# Patient Record
Sex: FEMALE | Race: ASIAN | NOT HISPANIC OR LATINO | Employment: PART TIME | URBAN - METROPOLITAN AREA
[De-identification: names, ages, dates, MRNs, and addresses within clinical notes are randomized per-mention and may not be internally consistent; named-entity substitution may affect disease eponyms.]

---

## 2019-08-20 ENCOUNTER — HOSPITAL ENCOUNTER (EMERGENCY)
Facility: HOSPITAL | Age: 49
Discharge: HOME | End: 2019-08-21
Attending: EMERGENCY MEDICINE | Admitting: EMERGENCY MEDICINE
Payer: COMMERCIAL

## 2019-08-20 VITALS
HEART RATE: 61 BPM | DIASTOLIC BLOOD PRESSURE: 83 MMHG | RESPIRATION RATE: 16 BRPM | OXYGEN SATURATION: 99 % | SYSTOLIC BLOOD PRESSURE: 124 MMHG | TEMPERATURE: 97.6 F

## 2019-08-20 DIAGNOSIS — M25.522 PAIN AND SWELLING OF LEFT ELBOW: Primary | ICD-10-CM

## 2019-08-20 DIAGNOSIS — M25.422 PAIN AND SWELLING OF LEFT ELBOW: Primary | ICD-10-CM

## 2019-08-20 DIAGNOSIS — S59.902A INJURY OF LEFT ELBOW: ICD-10-CM

## 2019-08-20 PROCEDURE — 99283 EMERGENCY DEPT VISIT LOW MDM: CPT

## 2019-08-21 ENCOUNTER — APPOINTMENT (EMERGENCY)
Dept: RADIOLOGY | Facility: HOSPITAL | Age: 49
End: 2019-08-21
Payer: COMMERCIAL

## 2019-08-21 PROCEDURE — 73080 X-RAY EXAM OF ELBOW: CPT

## 2019-08-21 PROCEDURE — 99284 EMERGENCY DEPT VISIT MOD MDM: CPT | Performed by: EMERGENCY MEDICINE

## 2019-08-21 PROCEDURE — 96372 THER/PROPH/DIAG INJ SC/IM: CPT

## 2019-08-21 RX ORDER — KETOROLAC TROMETHAMINE 30 MG/ML
15 INJECTION, SOLUTION INTRAMUSCULAR; INTRAVENOUS ONCE
Status: COMPLETED | OUTPATIENT
Start: 2019-08-21 | End: 2019-08-21

## 2019-08-21 RX ADMIN — KETOROLAC TROMETHAMINE 15 MG: 30 INJECTION, SOLUTION INTRAMUSCULAR at 00:25

## 2019-08-21 NOTE — ED PROVIDER NOTES
History  Chief Complaint   Patient presents with    Elbow Pain     pt hit left elbow on door about 3 hrs ago - reports pain and limited ROM d/t pain     HPI    50-year-old female presents for evaluation of left elbow pain  Patient is speaking through a google  on her phone  Patient hit her left elbow against a door hinge a few hours ago  Patient notes tenderness to the lateral aspect of her elbow and pain with full extension and flexion  Patient notes numbness and swelling of the elbow  Patient says she took Tylenol without relief of pain  Patient denies any shoulder or wrist pain  None       No past medical history on file  No past surgical history on file  No family history on file  I have reviewed and agree with the history as documented  Social History     Tobacco Use    Smoking status: Never Smoker    Smokeless tobacco: Never Used   Substance Use Topics    Alcohol use: Never     Frequency: Never    Drug use: Never        Review of Systems   Constitutional: Negative for chills, diaphoresis, fatigue and fever  HENT: Negative for congestion, rhinorrhea and sore throat  Eyes: Negative for photophobia and visual disturbance  Respiratory: Negative for cough, chest tightness and shortness of breath  Cardiovascular: Negative for chest pain and palpitations  Gastrointestinal: Negative for abdominal pain, blood in stool, constipation, diarrhea, nausea and vomiting  Genitourinary: Negative for dysuria, frequency and hematuria  Musculoskeletal: Positive for joint swelling and myalgias  Negative for back pain, gait problem, neck pain and neck stiffness  Skin: Negative for pallor and rash  Neurological: Negative for weakness, light-headedness, numbness and headaches  Hematological: Negative for adenopathy  Does not bruise/bleed easily  All other systems reviewed and are negative        Physical Exam  ED Triage Vitals [08/20/19 2328]   Temperature Pulse Respirations Blood Pressure SpO2   97 6 °F (36 4 °C) 61 16 124/83 99 %      Temp Source Heart Rate Source Patient Position - Orthostatic VS BP Location FiO2 (%)   Oral Monitor -- -- --      Pain Score       8             Orthostatic Vital Signs  Vitals:    08/20/19 2328   BP: 124/83   Pulse: 61       Physical Exam   Constitutional: She is oriented to person, place, and time  She appears well-developed and well-nourished  No distress  Patient is alert and oriented, appears well and nontoxic, in no acute distress   HENT:   Head: Normocephalic and atraumatic  Mouth/Throat: Oropharynx is clear and moist  No oropharyngeal exudate  Eyes: Pupils are equal, round, and reactive to light  Conjunctivae and EOM are normal    Neck: Normal range of motion  Neck supple  Cardiovascular: Normal rate, regular rhythm, normal heart sounds and intact distal pulses  Pulmonary/Chest: Effort normal and breath sounds normal  No respiratory distress  Abdominal: Soft  Bowel sounds are normal  She exhibits no distension  There is no tenderness  Musculoskeletal: Normal range of motion  She exhibits tenderness  She exhibits no deformity  LUE: No obvious deformity appreciated, patient has full range of motion had elbow joint foot pain on full extension and flexion, mild swelling and tender to palpation along lateral aspect of proximal radius, no obvious crepitus palpated, sensation pulse intact   Lymphadenopathy:     She has no cervical adenopathy  Neurological: She is alert and oriented to person, place, and time  Skin: Skin is warm and dry  Capillary refill takes less than 2 seconds  No rash noted  She is not diaphoretic  No erythema  No pallor  Psychiatric: She has a normal mood and affect  Her behavior is normal  Judgment and thought content normal    Nursing note and vitals reviewed        ED Medications  Medications   ketorolac (TORADOL) injection 15 mg (15 mg Intramuscular Given 8/21/19 0025)       Diagnostic Studies  Results Reviewed None                 XR elbow 3+ views LEFT    (Results Pending)         Procedures  Procedures        ED Course                               MDM  Number of Diagnoses or Management Options  Injury of left elbow:   Pain and swelling of left elbow:   Diagnosis management comments: 73-year-old female presents for evaluation of left elbow pain status post injury  Patient appears well and is hemodynamically stable  Will obtain x-ray of the left elbow and order Toradol  Discussed imaging result with patient that no acute fractures or dislocation appreciated  Placed patient in sling and recommended motrin every 6 hours for pain  Disposition  Final diagnoses:   Pain and swelling of left elbow   Injury of left elbow     Time reflects when diagnosis was documented in both MDM as applicable and the Disposition within this note     Time User Action Codes Description Comment    8/21/2019 12:27 AM Girma Aguirre [O80 [id number],  M25 [id number]] Pain and swelling of left elbow     8/21/2019 12:28 AM Girma Aguirre [V06 389O] Injury of left elbow       ED Disposition     ED Disposition Condition Date/Time Comment    Discharge Stable Wed Aug 21, 2019 12:26 AM Hilliard Nissen discharge to home/self care  Follow-up Information     Follow up With Specialties Details Why Contact Info    [facility] Go in 3 days As needed, If symptoms worsen [id number]  [place] [phone]            There are no discharge medications for this patient  No discharge procedures on file  ED Provider  Attending physically available and evaluated Hilliard Nissen I managed the patient along with the ED Attending      Electronically Signed by         Marshal Núñez MD  08/21/19 7418

## 2019-08-26 NOTE — ED ATTENDING ATTESTATION
Lety Adams MD, saw and evaluated the patient  I have discussed the patient with the resident/non-physician practitioner and agree with the resident's/non-physician practitioner's findings, Plan of Care, and MDM as documented in the resident's/non-physician practitioner's note, except where noted  All available labs and Radiology studies were reviewed  I was present for key portions of any procedure(s) performed by the resident/non-physician practitioner and I was immediately available to provide assistance  At this point I agree with the current assessment done in the Emergency Department  I have conducted an independent evaluation of this patient a history and physical is as follows:    25-year-old woman presenting with left elbow pain which she head against a door few hours ago  Has some numbness around the elbow but not going down the arm  No other complaints  There is no notable deformity to the elbow  Range of motion is intact and distal neurovascular is intact  Imaging with no acute abnormality  Will discharge with instructions for symptomatic care      Critical Care Time  Procedures

## 2020-09-05 ENCOUNTER — HOSPITAL ENCOUNTER (EMERGENCY)
Facility: HOSPITAL | Age: 50
Discharge: HOME | End: 2020-09-06
Attending: EMERGENCY MEDICINE
Payer: COMMERCIAL

## 2020-09-05 DIAGNOSIS — T30.0 BURN: Primary | ICD-10-CM

## 2020-09-05 DIAGNOSIS — T30.0 THERMAL BURN: ICD-10-CM

## 2020-09-05 PROCEDURE — 99283 EMERGENCY DEPT VISIT LOW MDM: CPT

## 2020-09-05 PROCEDURE — 99282 EMERGENCY DEPT VISIT SF MDM: CPT | Performed by: EMERGENCY MEDICINE

## 2020-09-05 RX ORDER — GINSENG 100 MG
1 CAPSULE ORAL ONCE
Status: COMPLETED | OUTPATIENT
Start: 2020-09-05 | End: 2020-09-05

## 2020-09-05 RX ORDER — BACITRACIN, NEOMYCIN, POLYMYXIN B 400; 3.5; 5 [USP'U]/G; MG/G; [USP'U]/G
1 OINTMENT TOPICAL ONCE
Status: DISCONTINUED | OUTPATIENT
Start: 2020-09-05 | End: 2020-09-05

## 2020-09-05 RX ORDER — GINSENG 100 MG
1 CAPSULE ORAL 2 TIMES DAILY
Qty: 28 G | Refills: 0 | Status: SHIPPED | OUTPATIENT
Start: 2020-09-05 | End: 2021-04-22 | Stop reason: HOSPADM

## 2020-09-05 RX ADMIN — BACITRACIN 1 LARGE APPLICATION: 500 OINTMENT TOPICAL at 23:59

## 2020-09-06 VITALS
HEART RATE: 68 BPM | OXYGEN SATURATION: 99 % | SYSTOLIC BLOOD PRESSURE: 118 MMHG | DIASTOLIC BLOOD PRESSURE: 86 MMHG | RESPIRATION RATE: 18 BRPM
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
## 2021-04-21 ENCOUNTER — HOSPITAL ENCOUNTER (OUTPATIENT)
Facility: HOSPITAL | Age: 51
Setting detail: OBSERVATION
Discharge: HOME | End: 2021-04-22
Attending: EMERGENCY MEDICINE | Admitting: INTERNAL MEDICINE
Payer: MEDICAID

## 2021-04-21 ENCOUNTER — APPOINTMENT (EMERGENCY)
Dept: RADIOLOGY | Facility: HOSPITAL | Age: 51
End: 2021-04-21
Payer: MEDICAID

## 2021-04-21 VITALS
OXYGEN SATURATION: 95 % | HEART RATE: 66 BPM | TEMPERATURE: 98 F | SYSTOLIC BLOOD PRESSURE: 123 MMHG | RESPIRATION RATE: 19 BRPM | DIASTOLIC BLOOD PRESSURE: 60 MMHG | BODY MASS INDEX: 29.27 KG/M2 | WEIGHT: 175.71 LBS | HEIGHT: 65 IN

## 2021-04-21 DIAGNOSIS — H93.12 TINNITUS OF LEFT EAR: ICD-10-CM

## 2021-04-21 DIAGNOSIS — R42 DIZZINESS: Primary | ICD-10-CM

## 2021-04-21 DIAGNOSIS — E87.6 HYPOKALEMIA: ICD-10-CM

## 2021-04-21 PROBLEM — G47.00 INSOMNIA: Status: ACTIVE | Noted: 2021-04-21

## 2021-04-21 LAB
ANION GAP SERPL CALCULATED.3IONS-SCNC: 7 MMOL/L (ref 4–13)
ATRIAL RATE: 81 BPM
BACTERIA UR QL AUTO: NORMAL /HPF
BASOPHILS # BLD AUTO: 0.05 THOUSANDS/ΜL (ref 0–0.1)
BASOPHILS NFR BLD AUTO: 1 % (ref 0–1)
BILIRUB UR QL STRIP: NEGATIVE
BUN SERPL-MCNC: 12 MG/DL (ref 5–25)
CALCIUM SERPL-MCNC: 8.8 MG/DL (ref 8.3–10.1)
CHLORIDE SERPL-SCNC: 106 MMOL/L (ref 100–108)
CLARITY UR: CLEAR
CO2 SERPL-SCNC: 22 MMOL/L (ref 21–32)
COLOR UR: YELLOW
CREAT SERPL-MCNC: 0.74 MG/DL (ref 0.6–1.3)
EOSINOPHIL # BLD AUTO: 0.01 THOUSAND/ΜL (ref 0–0.61)
EOSINOPHIL NFR BLD AUTO: 0 % (ref 0–6)
ERYTHROCYTE [DISTWIDTH] IN BLOOD BY AUTOMATED COUNT: 12.2 % (ref 11.6–15.1)
GFR SERPL CREATININE-BSD FRML MDRD: 95 ML/MIN/1.73SQ M
GLUCOSE SERPL-MCNC: 119 MG/DL (ref 65–140)
GLUCOSE UR STRIP-MCNC: NEGATIVE MG/DL
HCT VFR BLD AUTO: 38.6 % (ref 34.8–46.1)
HGB BLD-MCNC: 12.9 G/DL (ref 11.5–15.4)
HGB UR QL STRIP.AUTO: NEGATIVE
IMM GRANULOCYTES # BLD AUTO: 0.03 THOUSAND/UL (ref 0–0.2)
IMM GRANULOCYTES NFR BLD AUTO: 0 % (ref 0–2)
KETONES UR STRIP-MCNC: ABNORMAL MG/DL
LEUKOCYTE ESTERASE UR QL STRIP: NEGATIVE
LYMPHOCYTES # BLD AUTO: 0.94 THOUSANDS/ΜL (ref 0.6–4.47)
LYMPHOCYTES NFR BLD AUTO: 10 % (ref 14–44)
MCH RBC QN AUTO: 30.8 PG (ref 26.8–34.3)
MCHC RBC AUTO-ENTMCNC: 33.4 G/DL (ref 31.4–37.4)
MCV RBC AUTO: 92 FL (ref 82–98)
MONOCYTES # BLD AUTO: 0.27 THOUSAND/ΜL (ref 0.17–1.22)
MONOCYTES NFR BLD AUTO: 3 % (ref 4–12)
NEUTROPHILS # BLD AUTO: 7.71 THOUSANDS/ΜL (ref 1.85–7.62)
NEUTS SEG NFR BLD AUTO: 86 % (ref 43–75)
NITRITE UR QL STRIP: POSITIVE
NON-SQ EPI CELLS URNS QL MICRO: NORMAL /HPF
NRBC BLD AUTO-RTO: 0 /100 WBCS
P AXIS: 56 DEGREES
PH UR STRIP.AUTO: 7 [PH]
PLATELET # BLD AUTO: 236 THOUSANDS/UL (ref 149–390)
PMV BLD AUTO: 9.9 FL (ref 8.9–12.7)
POTASSIUM SERPL-SCNC: 5.3 MMOL/L (ref 3.5–5.3)
PR INTERVAL: 178 MS
PROT UR STRIP-MCNC: NEGATIVE MG/DL
QRS AXIS: 39 DEGREES
QRSD INTERVAL: 74 MS
QT INTERVAL: 364 MS
QTC INTERVAL: 422 MS
RBC # BLD AUTO: 4.19 MILLION/UL (ref 3.81–5.12)
RBC #/AREA URNS AUTO: NORMAL /HPF
SODIUM SERPL-SCNC: 135 MMOL/L (ref 136–145)
SP GR UR STRIP.AUTO: >1.045 (ref 1–1.03)
T WAVE AXIS: 41 DEGREES
UROBILINOGEN UR QL STRIP.AUTO: 0.2 E.U./DL
VENTRICULAR RATE: 81 BPM
WBC # BLD AUTO: 9.01 THOUSAND/UL (ref 4.31–10.16)
WBC #/AREA URNS AUTO: NORMAL /HPF

## 2021-04-21 PROCEDURE — 93005 ELECTROCARDIOGRAM TRACING: CPT

## 2021-04-21 PROCEDURE — 93010 ELECTROCARDIOGRAM REPORT: CPT | Performed by: INTERNAL MEDICINE

## 2021-04-21 PROCEDURE — 99285 EMERGENCY DEPT VISIT HI MDM: CPT | Performed by: EMERGENCY MEDICINE

## 2021-04-21 PROCEDURE — 99220 PR INITIAL OBSERVATION CARE/DAY 70 MINUTES: CPT | Performed by: INTERNAL MEDICINE

## 2021-04-21 PROCEDURE — 96374 THER/PROPH/DIAG INJ IV PUSH: CPT

## 2021-04-21 PROCEDURE — 99285 EMERGENCY DEPT VISIT HI MDM: CPT

## 2021-04-21 PROCEDURE — 70498 CT ANGIOGRAPHY NECK: CPT

## 2021-04-21 PROCEDURE — 85025 COMPLETE CBC W/AUTO DIFF WBC: CPT | Performed by: EMERGENCY MEDICINE

## 2021-04-21 PROCEDURE — 36415 COLL VENOUS BLD VENIPUNCTURE: CPT | Performed by: EMERGENCY MEDICINE

## 2021-04-21 PROCEDURE — 81001 URINALYSIS AUTO W/SCOPE: CPT | Performed by: INTERNAL MEDICINE

## 2021-04-21 PROCEDURE — 70496 CT ANGIOGRAPHY HEAD: CPT

## 2021-04-21 PROCEDURE — 80048 BASIC METABOLIC PNL TOTAL CA: CPT | Performed by: EMERGENCY MEDICINE

## 2021-04-21 PROCEDURE — 96375 TX/PRO/DX INJ NEW DRUG ADDON: CPT

## 2021-04-21 PROCEDURE — G1004 CDSM NDSC: HCPCS

## 2021-04-21 RX ORDER — TRAZODONE HYDROCHLORIDE 50 MG/1
50 TABLET ORAL
Status: DISCONTINUED | OUTPATIENT
Start: 2021-04-21 | End: 2021-04-21

## 2021-04-21 RX ORDER — MECLIZINE HYDROCHLORIDE 25 MG/1
25 TABLET ORAL ONCE
Status: COMPLETED | OUTPATIENT
Start: 2021-04-21 | End: 2021-04-21

## 2021-04-21 RX ORDER — SODIUM CHLORIDE, SODIUM GLUCONATE, SODIUM ACETATE, POTASSIUM CHLORIDE, MAGNESIUM CHLORIDE, SODIUM PHOSPHATE, DIBASIC, AND POTASSIUM PHOSPHATE .53; .5; .37; .037; .03; .012; .00082 G/100ML; G/100ML; G/100ML; G/100ML; G/100ML; G/100ML; G/100ML
75 INJECTION, SOLUTION INTRAVENOUS CONTINUOUS
Status: DISPENSED | OUTPATIENT
Start: 2021-04-21 | End: 2021-04-22

## 2021-04-21 RX ORDER — TRAZODONE HYDROCHLORIDE 50 MG/1
25 TABLET ORAL
Status: DISCONTINUED | OUTPATIENT
Start: 2021-04-21 | End: 2021-04-22 | Stop reason: HOSPADM

## 2021-04-21 RX ORDER — METOCLOPRAMIDE HYDROCHLORIDE 5 MG/ML
10 INJECTION INTRAMUSCULAR; INTRAVENOUS ONCE
Status: COMPLETED | OUTPATIENT
Start: 2021-04-21 | End: 2021-04-21

## 2021-04-21 RX ORDER — KETOROLAC TROMETHAMINE 30 MG/ML
15 INJECTION, SOLUTION INTRAMUSCULAR; INTRAVENOUS ONCE
Status: COMPLETED | OUTPATIENT
Start: 2021-04-21 | End: 2021-04-21

## 2021-04-21 RX ORDER — ONDANSETRON 2 MG/ML
4 INJECTION INTRAMUSCULAR; INTRAVENOUS EVERY 6 HOURS PRN
Status: DISCONTINUED | OUTPATIENT
Start: 2021-04-21 | End: 2021-04-22 | Stop reason: HOSPADM

## 2021-04-21 RX ORDER — MECLIZINE HYDROCHLORIDE 25 MG/1
25 TABLET ORAL EVERY 8 HOURS PRN
Status: DISCONTINUED | OUTPATIENT
Start: 2021-04-21 | End: 2021-04-22 | Stop reason: HOSPADM

## 2021-04-21 RX ORDER — TRAZODONE HYDROCHLORIDE 50 MG/1
25 TABLET ORAL
COMMUNITY

## 2021-04-21 RX ADMIN — IOHEXOL 85 ML: 350 INJECTION, SOLUTION INTRAVENOUS at 15:05

## 2021-04-21 RX ADMIN — MECLIZINE HYDROCHLORIDE 25 MG: 25 TABLET ORAL at 13:36

## 2021-04-21 RX ADMIN — SODIUM CHLORIDE, SODIUM GLUCONATE, SODIUM ACETATE, POTASSIUM CHLORIDE, MAGNESIUM CHLORIDE, SODIUM PHOSPHATE, DIBASIC, AND POTASSIUM PHOSPHATE 75 ML/HR: .53; .5; .37; .037; .03; .012; .00082 INJECTION, SOLUTION INTRAVENOUS at 20:29

## 2021-04-21 RX ADMIN — TRAZODONE HYDROCHLORIDE 25 MG: 50 TABLET ORAL at 22:35

## 2021-04-21 RX ADMIN — METOCLOPRAMIDE HYDROCHLORIDE 10 MG: 5 INJECTION INTRAMUSCULAR; INTRAVENOUS at 14:11

## 2021-04-21 RX ADMIN — KETOROLAC TROMETHAMINE 15 MG: 30 INJECTION, SOLUTION INTRAMUSCULAR at 14:11
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
## 2021-04-21 NOTE — ASSESSMENT & PLAN NOTE
Patient presents for approximately 10 days of dizziness with increasingly worsening symptoms; nausea and vomiting today  Reports that she had the 1st of 2 Moderna vaccines approximately 4 weeks ago; symptoms began approximately 10 days ago  CTA in the ED showed no abnormalities; received meclizine and Reglan in the ED with good symptomatic relief  Patient also reports here pain on the external ear and tinnitus for the last several days  In the ED, was found to have reduced sensation on left arm; ED admitted for observation  Likely labyrinthitis secondary to URI  Consult neurology; obtain orthostatics and UA  Patient denies nausea and vomiting on my exam; symptomatic treatment

## 2021-04-21 NOTE — PLAN OF CARE
Problem: Potential for Falls  Goal: Patient will remain free of falls  Description: INTERVENTIONS:  - Assess patient frequently for physical needs  -  Identify cognitive and physical deficits and behaviors that affect risk of falls    -  [place] fall precautions as indicated by assessment   - Educate patient/family on patient safety including physical limitations  - Instruct patient to call for assistance with activity based on assessment  - Modify environment to reduce risk of injury  - Consider OT/PT consult to assist with strengthening/mobility  Outcome: Progressing     Problem: PAIN - ADULT  Goal: Verbalizes/displays adequate comfort level or baseline comfort level  Description: Interventions:  - Encourage patient to monitor pain and request assistance  - Assess pain using appropriate pain scale  - Administer analgesics based on type and severity of pain and evaluate response  - Implement non-pharmacological measures as appropriate and evaluate response  - Consider cultural and social influences on pain and pain management  - Notify physician/advanced practitioner if interventions unsuccessful or patient reports new pain  Outcome: Progressing     Problem: INFECTION - ADULT  Goal: Absence or prevention of progression during hospitalization  Description: INTERVENTIONS:  - Assess and monitor for signs and symptoms of infection  - Monitor lab/diagnostic results  - Monitor all insertion sites, i e  indwelling lines, tubes, and drains  - [place] appropriate cooling/warming therapies per order  - Administer medications as ordered  - Instruct and encourage patient and family to use good hand hygiene technique  - Identify and instruct in appropriate isolation precautions for identified infection/condition  Outcome: Progressing  Goal: Absence of fever/infection during neutropenic period  Description: INTERVENTIONS:  - Monitor WBC    Outcome: Progressing     Problem: SAFETY ADULT  Goal: Patient will remain free of falls  Description: INTERVENTIONS:  - Assess patient frequently for physical needs  -  Identify cognitive and physical deficits and behaviors that affect risk of falls    -  [place] fall precautions as indicated by assessment   - Educate patient/family on patient safety including physical limitations  - Instruct patient to call for assistance with activity based on assessment  - Modify environment to reduce risk of injury  - Consider OT/PT consult to assist with strengthening/mobility  Outcome: Progressing  Goal: Maintain or return to baseline ADL function  Description: INTERVENTIONS:  -  Assess patient's ability to carry out ADLs; assess patient's baseline for ADL function and identify physical deficits which impact ability to perform ADLs (bathing, care of mouth/teeth, toileting, grooming, dressing, etc )  - Assess/evaluate cause of self-care deficits   - Assess range of motion  - Assess patient's mobility; develop plan if impaired  - Assess patient's need for assistive devices and provide as appropriate  - Encourage maximum independence but intervene and supervise when necessary  - Involve family in performance of ADLs  - Assess for home care needs following discharge   - Consider OT consult to assist with ADL evaluation and planning for discharge  - Provide patient education as appropriate  Outcome: Progressing  Goal: Maintain or return mobility status to optimal level  Description: INTERVENTIONS:  - Assess patient's baseline mobility status (ambulation, transfers, stairs, etc )    - Identify cognitive and physical deficits and behaviors that affect mobility  - Identify mobility aids required to assist with transfers and/or ambulation (gait belt, sit-to-stand, lift, walker, cane, etc )  - Lovely fall precautions as indicated by assessment  - Record patient progress and toleration of activity level on Mobility SBAR; progress patient to next Phase/Stage  - Instruct patient to call for assistance with activity based on assessment  - Consider rehabilitation consult to assist with strengthening/weightbearing, etc   Outcome: Progressing     Problem: DISCHARGE PLANNING  Goal: Discharge to home or other facility with appropriate resources  Description: INTERVENTIONS:  - Identify barriers to discharge w/patient and caregiver  - Arrange for needed discharge resources and transportation as appropriate  - Identify discharge learning needs (meds, wound care, etc )  - Arrange for interpretive services to assist at discharge as needed  - Refer to Case Management Department for coordinating discharge planning if the patient needs post-hospital services based on physician/advanced practitioner order or complex needs related to functional status, cognitive ability, or social support system  Outcome: Progressing     Problem: Knowledge Deficit  Goal: Patient/family/caregiver demonstrates understanding of disease process, treatment plan, medications, and discharge instructions  Description: Complete learning assessment and assess knowledge base    Interventions:  - Provide teaching at level of understanding  - Provide teaching via preferred learning methods  Outcome: Progressing
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
## 2021-04-21 NOTE — H&P
[address] [facility]  H&P- Robert Haji 1970, 53 y o  female MRN: [id number]  Unit/Bed#: [facility] 709-01 Encounter: [id number]  Primary Care Provider: Haley Carlton   Date and time admitted to hospital: 4/21/2021  1:07 PM    * Dizziness  Assessment & Plan  Patient presents for approximately 10 days of dizziness with increasingly worsening symptoms; nausea and vomiting today  Reports that she had the 1st of 2 Moderna vaccines approximately 4 weeks ago; symptoms began approximately 10 days ago  CTA in the ED showed no abnormalities; received meclizine and Reglan in the ED with good symptomatic relief  Patient also reports here pain on the external ear and tinnitus for the last several days  In the ED, was found to have reduced sensation on left arm; ED admitted for observation  Likely labyrinthitis secondary to URI  Consult neurology; obtain orthostatics and UA  Patient denies nausea and vomiting on my exam; symptomatic treatment    Insomnia  Assessment & Plan  Continue trazodone      VTE Prophylaxis: Enoxaparin (Lovenox)  / sequential compression device   Code Status:  Full  POLST: POLST form is not discussed and not completed at this time  Discussion with family: Discussed treatment plan with family and patient who agree with current plan; encouraged to ask questions and participate  Anticipated Length of Stay:  Patient will be admitted on an Observation basis with an anticipated length of stay of  less than 2 midnights  Justification for Hospital Stay:  Dizziness with ear pain    Total Time for Visit, including Counseling / Coordination of Care: 45 minutes  Greater than 50% of this total time spent on direct patient counseling and coordination of care  Chief Complaint:   Dizziness    History of Present Illness:    Robert Haji is a 53 y o  female with past medical history of insomnia who presents for 10 days of dizziness and recent ear pain with hyperacusis    Patient states that approximately 4 weeks ago she received the Moderna vaccination; 2 weeks after that, she noted the onset of tinnitus, ear pain, and dizziness  Dizziness has continued to increase and she has been nauseous with vomiting over the last day  Patient denies any other acute symptoms; Herminio speaking and normally resides in [place]  Was admitted by the ED staff for further evaluation as some concern for left upper extremity numbness in the ED  CTA of the head was negative, patient's symptoms have resolved with Reglan and meclizine  Will observe overnight and consult Neurology  Review of Systems:    Review of Systems   Constitutional: Positive for activity change and appetite change  Negative for chills and fever  HENT: Positive for ear pain and tinnitus  Negative for sore throat  Eyes: Negative for pain and visual disturbance  Respiratory: Negative for cough and shortness of breath  Cardiovascular: Negative for chest pain and palpitations  Gastrointestinal: Negative for abdominal pain and vomiting  Genitourinary: Negative for dysuria and hematuria  Musculoskeletal: Negative for arthralgias and back pain  Skin: Negative for color change and rash  Neurological: Positive for dizziness and headaches  Negative for seizures and syncope  All other systems reviewed and are negative  Past Medical and Surgical History:     History reviewed  No pertinent past medical history  Past Surgical History:   Procedure Laterality Date     SECTION         Meds/Allergies:    Prior to Admission medications    Medication Sig Start Date End Date Taking? Authorizing Provider   bacitracin topical ointment 500 units/g topical ointment Apply 1 large application topically 2 (two) times a day 20   Peri Harp, DO     I have reviewed home medications with patient personally      Allergies: No Known Allergies    Social History:     Marital Status: /Civil Union   Occupation:   Patient Pre-hospital Living Situation:  Independently  Patient Pre-hospital Level of Mobility:  Full  Patient Pre-hospital Diet Restrictions:  None  Substance Use History:   Social History     Substance and Sexual Activity   Alcohol Use Never    Frequency: Never     Social History     Tobacco Use   Smoking Status Never Smoker   Smokeless Tobacco Never Used     Social History     Substance and Sexual Activity   Drug Use Never       Family History:    History reviewed  No pertinent family history  Physical Exam:     Vitals:   Blood Pressure: 109/67 (04/21/21 1632)  Pulse: 65 (04/21/21 1632)  Temperature: 97 7 °F (36 5 °C) (04/21/21 1632)  Temp Source: Oral (04/21/21 1311)  Respirations: 17 (04/21/21 1632)  Weight - Scale: 72 6 kg (160 lb) (04/21/21 1311)  SpO2: 97 % (04/21/21 1632)    Physical Exam  Vitals signs and nursing note reviewed  Constitutional:       General: She is not in acute distress  Appearance: She is well-developed  HENT:      Head: Normocephalic and atraumatic  Eyes:      Conjunctiva/sclera: Conjunctivae normal    Neck:      Musculoskeletal: Neck supple  Cardiovascular:      Rate and Rhythm: Normal rate and regular rhythm  Heart sounds: No murmur  Pulmonary:      Effort: Pulmonary effort is normal  No respiratory distress  Breath sounds: Normal breath sounds  Abdominal:      Palpations: Abdomen is soft  Tenderness: There is no abdominal tenderness  Skin:     General: Skin is warm and dry  Neurological:      Mental Status: She is alert and oriented to person, place, and time  Comments: CN 2-12 within normal limits; 5/5 strength in all extremities, no dysmetria, good sensation in all extremities   Psychiatric:         Mood and Affect: Mood normal          Behavior: Behavior normal          Additional Data:     Lab Results: I have personally reviewed pertinent reports        Results from last 7 days   Lab Units 04/21/21  1509   WBC Thousand/uL 9 01   HEMOGLOBIN g/dL 12 9   HEMATOCRIT % 38 6   PLATELETS Thousands/uL 236   NEUTROS PCT % 86*   LYMPHS PCT % 10*   MONOS PCT % 3*   EOS PCT % 0     Results from last 7 days   Lab Units 04/21/21  1404   SODIUM mmol/L 135*   POTASSIUM mmol/L 5 3   CHLORIDE mmol/L 106   CO2 mmol/L 22   BUN mg/dL 12   CREATININE mg/dL 0 74   ANION GAP mmol/L 7   CALCIUM mg/dL 8 8   GLUCOSE RANDOM mg/dL 119                       Imaging: I have personally reviewed pertinent reports  CTA head and neck with and without contrast   Final Result by Emerald 6, DO (04/21 1525)      Normal CT of the brain  Normal CTA of the neck and brain  Workstation performed: [id number]             EKG, Pathology, and Other Studies Reviewed on Admission:   · EKG:  Normal sinus rhythm    Allscripts / Epic Records Reviewed: Yes     ** Please Note: This note has been constructed using a voice recognition system   **

## 2021-04-22 PROBLEM — E87.6 HYPOKALEMIA: Status: ACTIVE | Noted: 2021-04-22

## 2021-04-22 LAB
ALBUMIN SERPL BCP-MCNC: 3 G/DL (ref 3.5–5)
ALP SERPL-CCNC: 50 U/L (ref 46–116)
ALT SERPL W P-5'-P-CCNC: 27 U/L (ref 12–78)
ANION GAP SERPL CALCULATED.3IONS-SCNC: 8 MMOL/L (ref 4–13)
AST SERPL W P-5'-P-CCNC: 10 U/L (ref 5–45)
BILIRUB SERPL-MCNC: 0.69 MG/DL (ref 0.2–1)
BUN SERPL-MCNC: 8 MG/DL (ref 5–25)
CALCIUM ALBUM COR SERPL-MCNC: 8.9 MG/DL (ref 8.3–10.1)
CALCIUM SERPL-MCNC: 8.1 MG/DL (ref 8.3–10.1)
CHLORIDE SERPL-SCNC: 108 MMOL/L (ref 100–108)
CO2 SERPL-SCNC: 25 MMOL/L (ref 21–32)
CREAT SERPL-MCNC: 0.69 MG/DL (ref 0.6–1.3)
ERYTHROCYTE [DISTWIDTH] IN BLOOD BY AUTOMATED COUNT: 12.3 % (ref 11.6–15.1)
GFR SERPL CREATININE-BSD FRML MDRD: 103 ML/MIN/1.73SQ M
GLUCOSE SERPL-MCNC: 97 MG/DL (ref 65–140)
HCT VFR BLD AUTO: 37.5 % (ref 34.8–46.1)
HGB BLD-MCNC: 12.7 G/DL (ref 11.5–15.4)
MAGNESIUM SERPL-MCNC: 2.4 MG/DL (ref 1.6–2.6)
MCH RBC QN AUTO: 31.1 PG (ref 26.8–34.3)
MCHC RBC AUTO-ENTMCNC: 33.9 G/DL (ref 31.4–37.4)
MCV RBC AUTO: 92 FL (ref 82–98)
PHOSPHATE SERPL-MCNC: 2.8 MG/DL (ref 2.7–4.5)
PLATELET # BLD AUTO: 244 THOUSANDS/UL (ref 149–390)
PMV BLD AUTO: 10 FL (ref 8.9–12.7)
POTASSIUM SERPL-SCNC: 3.2 MMOL/L (ref 3.5–5.3)
PROT SERPL-MCNC: 6.5 G/DL (ref 6.4–8.2)
RBC # BLD AUTO: 4.08 MILLION/UL (ref 3.81–5.12)
SODIUM SERPL-SCNC: 141 MMOL/L (ref 136–145)
WBC # BLD AUTO: 6.82 THOUSAND/UL (ref 4.31–10.16)

## 2021-04-22 PROCEDURE — 84100 ASSAY OF PHOSPHORUS: CPT | Performed by: INTERNAL MEDICINE

## 2021-04-22 PROCEDURE — 97166 OT EVAL MOD COMPLEX 45 MIN: CPT

## 2021-04-22 PROCEDURE — 99217 PR OBSERVATION CARE DISCHARGE MANAGEMENT: CPT | Performed by: GENERAL PRACTICE

## 2021-04-22 PROCEDURE — 97162 PT EVAL MOD COMPLEX 30 MIN: CPT

## 2021-04-22 PROCEDURE — 80053 COMPREHEN METABOLIC PANEL: CPT | Performed by: INTERNAL MEDICINE

## 2021-04-22 PROCEDURE — 85027 COMPLETE CBC AUTOMATED: CPT | Performed by: INTERNAL MEDICINE

## 2021-04-22 PROCEDURE — 83735 ASSAY OF MAGNESIUM: CPT | Performed by: INTERNAL MEDICINE

## 2021-04-22 PROCEDURE — 99244 OFF/OP CNSLTJ NEW/EST MOD 40: CPT | Performed by: PSYCHIATRY & NEUROLOGY

## 2021-04-22 RX ORDER — MECLIZINE HYDROCHLORIDE 25 MG/1
25 TABLET ORAL EVERY 8 HOURS PRN
Qty: 30 TABLET | Refills: 0 | Status: SHIPPED | OUTPATIENT
Start: 2021-04-22

## 2021-04-22 RX ORDER — POTASSIUM CHLORIDE 20 MEQ/1
20 TABLET, EXTENDED RELEASE ORAL DAILY
Qty: 15 TABLET | Refills: 0 | Status: SHIPPED | OUTPATIENT
Start: 2021-04-23

## 2021-04-22 RX ORDER — POTASSIUM CHLORIDE 20 MEQ/1
20 TABLET, EXTENDED RELEASE ORAL DAILY
Status: DISCONTINUED | OUTPATIENT
Start: 2021-04-22 | End: 2021-04-22 | Stop reason: HOSPADM

## 2021-04-22 RX ADMIN — ENOXAPARIN SODIUM 40 MG: 40 INJECTION SUBCUTANEOUS at 08:32

## 2021-04-22 RX ADMIN — POTASSIUM CHLORIDE 20 MEQ: 1500 TABLET, EXTENDED RELEASE ORAL at 11:10

## 2021-04-22 NOTE — ASSESSMENT & PLAN NOTE
Assessment:  Hardeep White is a 53 y o  female who initially presented to the hospital on 04/21 with 10 days of persisting intermittent dizziness associated with left ear pain and hyper accuses  Of note patient did recently receive her 1st dose of COVID vaccination  She states she had ongoing dizziness which was associated with nausea and vomiting which prompted her to come to the emergency department  After receiving metoclopramide and meclizine in the ED her symptoms resolved significantly  Patient was admitted for further evaluation  Impression:  · Most likely etiology is labyrinthitis, possibly due to inflammation secondary to COVID vaccine  Patient's vertigo, nausea/vomiting, unilateral hearing loss, and tinnitus provoked by palpation support this diagnosis  Other possible diagnosis is Meniere's disease, however her vertigo does not seem to be spontaneous episodes  Low suspicion for stroke given non lateralizing exam and positional symptoms  Plan:  · Encouraged patient to still get her COVID vaccine dose #2 tomorrow   Should her symptoms recur, she can come back to the hospital and at that point may consider a trial of steroids  · No other acute recommendation please call for questions or concerns  · Does not require outpatient Neurology follow up   · No further inpatient neurological recommendations, okay for discharge from Neurology standpoint

## 2021-04-22 NOTE — DISCHARGE SUMMARY
[address] [facility]  Discharge- Yanely Echeverria 1970, 53 y o  female MRN: [id number]  Unit/Bed#: [facility] 709-01 Encounter: [id number]  Primary Care Provider: Audie Rinne   Date and time admitted to hospital: 4/21/2021  1:07 PM    * Dizziness  Assessment & Plan  Patient presents for approximately 10 days of dizziness with increasingly worsening symptoms; nausea and vomiting today  Reports that she had the 1st of 2 Moderna vaccines approximately 4 weeks ago; symptoms began approximately 10 days ago  CTA in the ED showed no abnormalities; received meclizine and Reglan in the ED with good symptomatic relief  Patient also reports here pain on the external ear and tinnitus for the last several days  In the ED, was found to have reduced sensation on left arm; ED admitted for observation  Likely labyrinthitis secondary to COVID Vaccine  Neuro appreciated  Patient denies nausea and vomiting since treated w/ Reglan and Meclizine   No need for MRI  As long as pt asymptomatic tomorrow, ok to get COVID booster  If symptomatic after vaccine, PCP can consider steroids    D/c on meclizine prn    Hypokalemia  Assessment & Plan  Kdur daily at d/c  Likely 2/2 vomiting    Insomnia  Assessment & Plan  Continue trazodone      Discharging Physician / Practitioner: Dagmar Candelario DO  PCP: Audie Rinne  Admission Date:   Admission Orders (From admission, onward)     Ordered        04/21/21 1559  Place in Observation  Once                   Discharge Date: 04/22/21    Resolved Problems  Date Reviewed: 4/22/2021    None          Consultations During Hospital Stay:  · Neuro    Procedures Performed:   · none    Significant Findings / Test Results:   · CTH/CTA WNL    Incidental Findings:   · none     Test Results Pending at Discharge (will require follow up):   · none     Outpatient Tests Requested:  · PCP should check BMP in 1-2 weeks    Complications:  none    Reason for Admission: dizziness    Hospital Course:     Ashanti Walden Tierney Ivey is a 53 y o  female patient who originally presented to the hospital on 4/21/2021 due to dizziness  PT tx w/ Reglan and meclizine  Can take meclizine prn if symptoms recur  If symptomatic after COVID vaccine, can go on steroids for presumed aseptic labyrinthitis per neuro  Please see above list of diagnoses and related plan for additional information  Condition at Discharge: stable     Discharge Day Visit / Exam:     Subjective:  Feels better  Vitals: Blood Pressure: 123/60 (04/21/21 2226)  Pulse: 66 (04/21/21 2226)  Temperature: 98 °F (36 7 °C) (04/21/21 2226)  Temp Source: Oral (04/21/21 1311)  Respirations: 19 (04/21/21 2226)  Height: 5' 4 5" (163 8 cm) (04/21/21 1925)  Weight - Scale: 79 7 kg (175 lb 11 3 oz) (04/21/21 1925)  SpO2: 95 % (04/21/21 2226)  Exam:   Physical Exam  HENT:      Head: Normocephalic and atraumatic  Nose: Nose normal       Mouth/Throat:      Mouth: Mucous membranes are moist    Eyes:      Extraocular Movements: Extraocular movements intact  Conjunctiva/sclera: Conjunctivae normal    Neck:      Musculoskeletal: Normal range of motion and neck supple  Cardiovascular:      Rate and Rhythm: Normal rate and regular rhythm  Pulmonary:      Effort: Pulmonary effort is normal       Breath sounds: Normal breath sounds  No wheezing or rales  Abdominal:      General: Bowel sounds are normal  There is no distension  Palpations: Abdomen is soft  Tenderness: There is no abdominal tenderness  Musculoskeletal: Normal range of motion  Right lower leg: No edema  Left lower leg: No edema  Skin:     General: Skin is warm and dry  Neurological:      Mental Status: She is alert and oriented to person, place, and time  Mental status is at baseline  Discussion with Family: no    Discharge instructions/Information to patient and family:   See after visit summary for information provided to patient and family        Provisions for Follow-Up Care:  See after visit summary for information related to follow-up care and any pertinent home health orders  Disposition:     Home    For Discharges to [address] [facility]:   · Not Applicable to this Patient - Not Applicable to this Patient    Planned Readmission: no     Discharge Statement:  I spent 32 minutes discharging the patient  This time was spent on the day of discharge  I had direct contact with the patient on the day of discharge  Greater than 50% of the total time was spent examining patient, answering all patient questions, arranging and discussing plan of care with patient as well as directly providing post-discharge instructions  Additional time then spent on discharge activities  Discharge Medications:  See after visit summary for reconciled discharge medications provided to patient and family        ** Please Note: This note has been constructed using a voice recognition system **

## 2021-04-22 NOTE — UTILIZATION REVIEW
Initial Clinical Review    Admission: Date/Time/Statement:   Admission Orders (From admission, onward)     Ordered        04/21/21 1559  Place in Observation  Once                   Orders Placed This Encounter   Procedures    Place in Observation     Standing Status:   Standing     Number of Occurrences:   1     Order Specific Question:   Level of Care     Answer:   Med Surg [16]     ED Arrival Information     Expected Arrival Acuity Means of Arrival Escorted By Service Admission Type    - 4/21/2021 13:01 Urgent Walk-In Self Hospitalist Urgent    Arrival Complaint    Dizziness/Vomiting        Chief Complaint   Patient presents with    Dizziness     Patient reports dizziness for about a week; states that she has also been having some ear pain; denies syncope episodes or falls r/t dizziness       Initial Presentation:     53year old female presents to ed from home for evaluation and treatment of worsening dizziness associated with nausea and vomiting which began today  Onset of dizziness  10 days ago  Patient also reports tinnitus and external ear pain  On clinical exam patient has decreased sensation on left arm  CTA without abnormality  UA positive nitrite  Treated with toradol, meclizine and reglan with good relief  Admit to observation for dizziness  And suspected labyrinthitis r/t upper respiratory infection  Plan includes consult neurology,, orthostatics, urine culture         ED Triage Vitals [04/21/21 1311]   97 6 °F (36 4 °C) 90 18 125/81 100 %      Oral Monitor         No Pain          Wt Readings from Last 1 Encounters:      Additional Vital Signs:     Date/Time  Temp  Pulse  Resp  BP  MAP (mmHg)  SpO2  O2 Device  Patient Position - Orthostatic VS   04/21/21 22:26:32  98 °F (36 7 °C)  66  19  123/60  81  95 %  --     04/21/21 19:25:46        117/74  88      Standing - Orthostatic VS   04/21/21 19:24:45        117/77  90      Sitting - Orthostatic VS   04/21/21 19:20:13        111/67  82 Lying - Orthostatic VS   04/21/21 16:32:13  97 7 °F (36 5 °C)  65  17  109/67  81  97 %  --     04/21/21 1515  --  70  20  128/78  --  100 %  None (Room air)                 Pertinent Labs/Diagnostic Test Results:            CTA head and neck with and without contrast   (04/21 1525)      Normal CT of the brain  Normal CTA of the neck and brain          Results from last 7 days   Lab Units 04/22/21  0539 04/21/21  1509   WBC Thousand/uL 6 82 9 01   HEMOGLOBIN g/dL 12 7 12 9   HEMATOCRIT % 37 5 38 6   PLATELETS Thousands/uL 244 236   NEUTROS ABS Thousands/µL  --  7 71*         Results from last 7 days   Lab Units 04/22/21  0539 04/21/21  1404   SODIUM mmol/L 141 135*   POTASSIUM mmol/L 3 2* 5 3   CHLORIDE mmol/L 108 106   CO2 mmol/L 25 22   ANION GAP mmol/L 8 7   BUN mg/dL 8 12   CREATININE mg/dL 0 69 0 74   EGFR ml/min/1 73sq m 103 95   CALCIUM mg/dL 8 1* 8 8   MAGNESIUM mg/dL 2 4  --    PHOSPHORUS mg/dL 2 8  --      Results from last 7 days   Lab Units 04/22/21  0539   AST U/L 10   ALT U/L 27   ALK PHOS U/L 50   TOTAL PROTEIN g/dL 6 5   ALBUMIN g/dL 3 0*   TOTAL BILIRUBIN mg/dL 0 69         Results from last 7 days   Lab Units 04/22/21  0539 04/21/21  1404   GLUCOSE RANDOM mg/dL 97 119       Results from last 7 days   Lab Units 04/21/21  1919   CLARITY UA  Clear   COLOR UA  Yellow   SPEC GRAV UA  >1 045*   PH UA  7 0   GLUCOSE UA mg/dl Negative   KETONES UA mg/dl 15 (1+)*   BLOOD UA  Negative   PROTEIN UA mg/dl Negative   NITRITE UA  Positive*   BILIRUBIN UA  Negative   UROBILINOGEN UA E U /dl 0 2   LEUKOCYTES UA  Negative   WBC UA /hpf None Seen   RBC UA /hpf None Seen   BACTERIA UA /hpf None Seen   EPITHELIAL CELLS WET PREP /hpf Occasional       ED Treatment:   Medication Administration from 04/21/2021 1300 to 04/21/2021 1614       Date/Time Order Dose Route Action     04/21/2021 1336 meclizine (ANTIVERT) tablet 25 mg 25 mg Oral Given     04/21/2021 1411 metoclopramide (REGLAN) injection 10 mg 10 mg Intravenous Given     04/21/2021 1411 ketorolac (TORADOL) injection 15 mg 15 mg Intravenous Given        History reviewed  No pertinent past medical history  Present on Admission:   Insomnia   Dizziness      Admitting Diagnosis:     Dizziness [R42]  Tinnitus of left ear [H93 12]    Age/Sex: 53 y o  female     Admission Orders:    enoxaparin, 40 mg, Subcutaneous, Daily  traZODone, 25 mg, Oral, HS      Continuous IV Infusions:  multi-electrolyte, 75 mL/hr, Intravenous, Continuous      PRN Meds:  meclizine, 25 mg, Oral, Q8H PRN  ondansetron, 4 mg, Intravenous, Q6H PRN        IP CONSULT TO NEUROLOGY    Network Utilization Review Department  ATTENTION: Please call with any questions or concerns to [phone] and carefully listen to the prompts so that you are directed to the right person  All voicemails are confidential   [facility] all requests for admission clinical reviews, approved or denied determinations and any other requests to dedicated fax number below belonging to the campus where the patient is receiving treatment   List of dedicated fax numbers for the Facilities:  [address] DENIALS (Administrative/Medical Necessity) [phone]   [address] (Maternity/NICU/Pediatrics) [phone]   [address] [place] [address] [address] [place] [address] [address] [facility] [phone]

## 2021-04-22 NOTE — ASSESSMENT & PLAN NOTE
Patient presents for approximately 10 days of dizziness with increasingly worsening symptoms; nausea and vomiting today  Reports that she had the 1st of 2 Moderna vaccines approximately 4 weeks ago; symptoms began approximately 10 days ago  CTA in the ED showed no abnormalities; received meclizine and Reglan in the ED with good symptomatic relief  Patient also reports here pain on the external ear and tinnitus for the last several days  In the ED, was found to have reduced sensation on left arm; ED admitted for observation  Likely labyrinthitis secondary to COVID Vaccine  Neuro appreciated  Patient denies nausea and vomiting since treated w/ Reglan and Meclizine   No need for MRI  As long as pt asymptomatic tomorrow, ok to get COVID booster  If symptomatic after vaccine, PCP can consider steroids    D/c on meclizine prn

## 2021-04-22 NOTE — UTILIZATION REVIEW
Observation Admission Authorization Request   NOTIFICATION OF OBSERVATION ADMISSION/OBSERVATION AUTHORIZATION REQUEST   SERVICING FACILITY:   [facility]  Address: [address] [place] [address], [address] [facility] [address]  Tax ID: [id number]  NPI: [id number]  Place of Service: On [address] [facility] Code: 22  CPT Code for Observation: CPT   CPT 26967     ATTENDING PROVIDER:  Attending Name and NPI#: Hussein Wynne [[id number]]  Address: [address] [place] [address], [address] [place] [address]  Phone: [phone]     UTILIZATION REVIEW CONTACT:  Jatinder Santos Utilization   Network Utilization Review Department  Phone: [phone]  Fax: [phone]  Email: [email or web address]     PHYSICIAN ADVISORY SERVICES:  FOR [id number] REVIEW - MEDICAL NECESSITY DENIAL  Phone: [phone]  Fax: [phone]  Email: [email or web address]     TYPE OF REQUEST:  Observation Status     ADMISSION INFORMATION:  ADMISSION DATE/TIME:   PATIENT DIAGNOSIS CODE/DESCRIPTION:  Dizziness [R42]  Tinnitus of left ear [H93 12]  DISCHARGE DATE/TIME: No discharge date for patient encounter  DISCHARGE DISPOSITION (IF DISCHARGED): Home/Self Care     IMPORTANT INFORMATION:  Please contact the Jatinder Santso directly with any questions or concerns regarding this request  Department voicemails are confidential     Send requests for admission clinical reviews, concurrent reviews, approvals, and administrative denials due to lack of clinical to fax [phone]

## 2021-04-22 NOTE — CONSULTS
NEUROLOGY RESIDENCY CONSULT NOTE     Name: Susy Wong   Age & Sex: 53 y o  female   MRN: [id number]  Unit/Bed#: [facility] 709-01   Encounter: [id number]  Length of Stay: 0    ASSESSMENT & PLAN     * Dizziness  Assessment & Plan  Assessment:  Susy Wong is a 53 y o  female who initially presented to the hospital on 04/21 with 10 days of persisting intermittent dizziness associated with left ear pain and hyper accuses  Of note patient did recently receive her 1st dose of COVID vaccination  She states she had ongoing dizziness which was associated with nausea and vomiting which prompted her to come to the emergency department  After receiving metoclopramide and meclizine in the ED her symptoms resolved significantly  Patient was admitted for further evaluation  Impression:  · Most likely etiology is labyrinthitis, possibly due to inflammation secondary to COVID vaccine  Patient's vertigo, nausea/vomiting, unilateral hearing loss, and tinnitus provoked by palpation support this diagnosis  Other possible diagnosis is Meniere's disease, however her vertigo does not seem to be spontaneous episodes  Low suspicion for stroke given non lateralizing exam and positional symptoms  Plan:  · Encouraged patient to still get her COVID vaccine dose #2 tomorrow  Should her symptoms recur, she can come back to the hospital and at that point may consider a trial of steroids  · No other acute recommendation please call for questions or concerns  · Does not require outpatient Neurology follow up   · No further inpatient neurological recommendations, okay for discharge from Neurology standpoint      Susy Wong will not need outpatient follow up with Neurology  She will not require outpatient neurological testing       SUBJECTIVE     Reason for Consult / Principal Problem: dizziness      HPI: Susy Wong is a 53 y o  female with a past medical history of insomnia on trazadone who presents on 4/21/21 with 10 days of dizziness with associated left ear pain and hyperacusis  4 weeks ago, she received the first dose of the [address] vaccine  She had one episode of dizziness that resolved after 24 hours  2 weeks later, she noted tinnitus, ear pain, and dizziness  The dizziness progressively worsened and was accompanied by nausea and vomiting on day of admission  The dizziness is described as swaying side to side  It is exacerbated by sitting and turning her head, it is alleviated by lying down, closing her eyes, and being still  She notes that her blood pressure is typically 110/70 but would be increased to 120-140s during these episodes of dizziness  The tinnitus is triggered when she touches the external ear  Prior to one month ago, she had never had symptoms like these  In ED, received meclizine and metoclopramide which resolved symptoms  There was concern for left upper extremity weakness  CTA head was negative  Today, she notes that her dizziness is much improved  She feels it when sitting up, but is fine when she is lying still  She notes numbness of the left external ear and changed sensation to touch on the lateral left face  She denies headache, changes to vision, rhinorrhea, shortness of breath, cough, chest pain  Inpatient consult to Neurology  Consult performed by: Desiree Norton DO  Consult ordered by: Gage Monroy MD          Inpatient consult to Neurology     Date/Time 2021 12:57 PM     Performed by  Desiree Norton DO     Authorized by Gage Monroy MD              Historical Information   History reviewed  No pertinent past medical history    Past Surgical History:   Procedure Laterality Date     SECTION       Social History   Social History     Substance and Sexual Activity   Alcohol Use Never    Frequency: Never     Social History     Substance and Sexual Activity   Drug Use Never     E-Cigarette/Vaping    E-Cigarette Use Never User      E-Cigarette/Vaping Substances    Nicotine No  THC No     CBD No     Flavoring No     Other No     Unknown No      Social History     Tobacco Use   Smoking Status Never Smoker   Smokeless Tobacco Never Used     Family History: History reviewed  No pertinent family history  Meds/Allergies   current meds:   Current Facility-Administered Medications   Medication Dose Route Frequency    enoxaparin (LOVENOX) subcutaneous injection 40 mg  40 mg Subcutaneous Daily    meclizine (ANTIVERT) tablet 25 mg  25 mg Oral Q8H PRN    ondansetron (ZOFRAN) injection 4 mg  4 mg Intravenous Q6H PRN    potassium chloride (K-DUR,KLOR-CON) CR tablet 20 mEq  20 mEq Oral Daily    traZODone (DESYREL) tablet 25 mg  25 mg Oral HS     No Known Allergies    Review of previous medical records was completed  Review of Systems   Constitutional: Negative for chills and fever  HENT: Negative for ear pain and sore throat  Eyes: Negative for pain and visual disturbance  Respiratory: Negative for cough and shortness of breath  Cardiovascular: Negative for chest pain and palpitations  Gastrointestinal: Negative for abdominal pain and vomiting  Genitourinary: Negative for dysuria and hematuria  Musculoskeletal: Negative for arthralgias and back pain  Skin: Negative for color change and rash  Neurological: Positive for dizziness (Improved)  Negative for tremors, seizures, syncope, facial asymmetry, weakness, light-headedness, numbness and headaches  All other systems reviewed and are negative  OBJECTIVE     Patient ID: Kathrene Lennox is a 53 y o  female  Vitals:   Vitals:    04/21/21 1920 04/21/21 1924 04/21/21 1925 04/21/21 2226   BP: 111/67 117/77 117/74 123/60   BP Location: Right arm Right arm Right arm    Pulse:    66   Resp:    19   Temp:    98 °F (36 7 °C)   TempSrc:       SpO2:    95%   Weight:   79 7 kg (175 lb 11 3 oz)    Height:   5' 4 5" (1 638 m)       Body mass index is 29 69 kg/m²       Intake/Output Summary (Last 24 hours) at 4/22/2021 1301  Last data filed at 2021 0853  Gross per 24 hour   Intake 360 ml   Output --   Net 360 ml       Temperature:   Temp (24hrs), Av 8 °F (36 6 °C), Min:97 6 °F (36 4 °C), Max:98 °F (36 7 °C)    Temperature: 98 °F (36 7 °C)    Invasive Devices: Invasive Devices     Peripheral Intravenous Line            Peripheral IV 21 Right Antecubital less than 1 day                Physical Exam  Vitals signs and nursing note reviewed  Constitutional:       General: She is not in acute distress  Appearance: Normal appearance  She is well-developed  HENT:      Head: Normocephalic and atraumatic  Ears:      Right Rinne: AC > BC  Left Rinne: AC > BC  Eyes:      Extraocular Movements: EOM normal       Conjunctiva/sclera: Conjunctivae normal       Pupils: Pupils are equal, round, and reactive to light  Neck:      Musculoskeletal: Neck supple  Skin:     General: Skin is warm and dry  Neurological:      Mental Status: She is alert and oriented to person, place, and time  Deep Tendon Reflexes: Strength normal       Reflex Scores:       Tricep reflexes are 2+ on the right side and 2+ on the left side  Bicep reflexes are 2+ on the right side and 2+ on the left side  Brachioradialis reflexes are 2+ on the right side and 2+ on the left side  Patellar reflexes are 2+ on the right side and 2+ on the left side  Achilles reflexes are 2+ on the right side and 2+ on the left side  Psychiatric:         Speech: Speech normal           Neurologic Exam     Mental Status   Oriented to person, place, and time  Attention: normal  Concentration: normal    Speech: speech is normal   Level of consciousness: alert    Cranial Nerves     CN II   Visual acuity: normal  Right visual field deficit: none  Left visual field deficit: none     CN III, IV, VI   Pupils are equal, round, and reactive to light  Extraocular motions are normal    Nystagmus: left     CN V   Facial sensation intact  Left facial sensation deficit: Increased sensitivity on left side  CN VII   Facial expression full, symmetric  CN VIII   Hearing: impaired (Decreased on left)  Right Rinne: AC > BC  Left Rinne: AC > BC    CN IX, X   Palate: symmetric    CN XI   CN XI normal      CN XII   CN XII normal      Motor Exam   Muscle bulk: normal  Overall muscle tone: normal  Right arm tone: normal  Left arm tone: normal  Right arm pronator drift: absent  Left arm pronator drift: absent    Strength   Strength 5/5 throughout  Sensory Exam   Light touch normal    Vibration normal      Gait, Coordination, and Reflexes     Reflexes   Right brachioradialis: 2+  Left brachioradialis: 2+  Right biceps: 2+  Left biceps: 2+  Right triceps: 2+  Left triceps: 2+  Right patellar: 2+  Left patellar: 2+  Right achilles: 2+  Left achilles: 2+  Right : 2+  Left : 2+           LABORATORY DATA     Labs: I have personally reviewed pertinent reports  Results from last 7 days   Lab Units 04/22/21  0539 04/21/21  1509   WBC Thousand/uL 6 82 9 01   HEMOGLOBIN g/dL 12 7 12 9   HEMATOCRIT % 37 5 38 6   PLATELETS Thousands/uL 244 236   NEUTROS PCT %  --  86*   MONOS PCT %  --  3*      Results from last 7 days   Lab Units 04/22/21  0539 04/21/21  1404   POTASSIUM mmol/L 3 2* 5 3   CHLORIDE mmol/L 108 106   CO2 mmol/L 25 22   BUN mg/dL 8 12   CREATININE mg/dL 0 69 0 74   CALCIUM mg/dL 8 1* 8 8   ALK PHOS U/L 50  --    ALT U/L 27  --    AST U/L 10  --      Results from last 7 days   Lab Units 04/22/21  0539   MAGNESIUM mg/dL 2 4     Results from last 7 days   Lab Units 04/22/21  0539   PHOSPHORUS mg/dL 2 8                    IMAGING & DIAGNOSTIC TESTING     Radiology Results: I have personally reviewed pertinent reports  CTA head and neck with and without contrast   Final Result by Emerald Viera DO (04/21 1525)      Normal CT of the brain  Normal CTA of the neck and brain              Workstation performed: [id number] Other Diagnostic Testing: I have personally reviewed pertinent reports  ACTIVE MEDICATIONS     Current Facility-Administered Medications   Medication Dose Route Frequency    enoxaparin (LOVENOX) subcutaneous injection 40 mg  40 mg Subcutaneous Daily    meclizine (ANTIVERT) tablet 25 mg  25 mg Oral Q8H PRN    ondansetron (ZOFRAN) injection 4 mg  4 mg Intravenous Q6H PRN    potassium chloride (K-DUR,KLOR-CON) CR tablet 20 mEq  20 mEq Oral Daily    traZODone (DESYREL) tablet 25 mg  25 mg Oral HS       Prior to Admission medications    Medication Sig Start Date End Date Taking?  Authorizing Provider   traZODone (DESYREL) 50 mg tablet Take 25 mg by mouth daily at bedtime   Yes Historical Provider, MD   bacitracin topical ointment 500 units/g topical ointment Apply 1 large application topically 2 (two) times a day  Patient not taking: Reported on 4/21/2021 9/5/20   Zelda Oliveira DO         CODE STATUS & ADVANCED DIRECTIVES     Code Status: Level 1 - Full Code  Advance Directive and Living Will:      Power of :    POLST:        VTE Pharmacologic Prophylaxis: Enoxaparin (Lovenox)  VTE Mechanical Prophylaxis: sequential compression device    ==  MD Laura Covarrubias's Neurology Residency, PGY-2

## 2021-04-22 NOTE — CASE MANAGEMENT
OBS   LOS 1    Met with patient to discuss CM role and DCP  Pt has an google translation john on her phone that this CM used with her to have a conversation  Observation notice explained and pt signed same  She expressed understanding that I was talking about insurance as the Observations status can be confusing  Pt lives in [place] w/ daughter in 1st floor apartment  Independent PTA  Works and Drives  Primary contact/SO/Shashi Lynne [phone] lives locally  No DME  No prior VN or inpatient rehab  No d/c needs evaluated  Pt is scheduled for d/c home today  SO will transport  CM reviewed d/c planning process including the following: identifying help at home, patient preference for d/c planning needs, Discharge Lounge, [facility] Meds to Bed program, availability of treatment team to discuss questions or concerns patient and/or family may have regarding understanding medications and recognizing signs and symptoms once discharged  CM also encouraged patient to follow up with all recommended appointments after discharge  Patient advised of importance for patient and family to participate in managing patients medical well being

## 2021-04-22 NOTE — DISCHARGE INSTRUCTIONS
Take tylenol and Meclizine if symptoms recur  IF you feel well, you may get the 2nd COVID vaccine     Dizziness   AMBULATORY CARE:   Dizziness  is a feeling of being off balance or unsteady  Common causes of dizziness are an inner ear fluid imbalance or a lack of oxygen in your blood  Dizziness may be acute (lasts 3 days or less) or chronic (lasts longer than 3 days)  You may have dizzy spells that last from seconds to a few hours  Common symptoms that may happen with dizziness:   · A feeling that your surroundings are moving even though you are standing still    · Ringing in your ears or hearing loss     · Feeling faint or lightheaded     · Weakness or unsteadiness     · Double vision or eye movements you cannot control    · Nausea or vomiting     · Confusion    Seek care immediately if:   · You have a headache and a stiff neck  · You have shaking chills and a fever  · You vomit over and over with no relief  · Your vomit or bowel movements are red or black  · You have pain in your chest, back, or abdomen  · You have numbness, especially in your face, arms, or legs  · You have trouble moving your arms or legs  · You are confused  Contact your healthcare provider if:   · You have a fever  · Your symptoms do not get better with treatment  · You have questions or concerns about your condition or care  Treatment for dizziness  depends on the cause  Your healthcare provider may give you oxygen or medicines to decrease your dizziness and nausea  He may also refer you to a specialist  Doc Soliz may need to be admitted to the hospital for treatment  Manage your symptoms:   · Do not drive  or operate heavy machinery when you are dizzy  · Get up slowly  from sitting or lying down  · Drink plenty of liquids  Liquids help prevent dehydration  Ask how much liquid to drink each day and which liquids are best for you      Follow up with your healthcare provider as directed:  Write down your questions so you remember to ask them during your visits  © Copyright [address] Information is for End User's use only and may not be sold, redistributed or otherwise used for commercial purposes  All illustrations and images included in CareNotes® are the copyrighted property of A D A M , Inc  or Niki Noonan  The above information is an  only  It is not intended as medical advice for individual conditions or treatments  Talk to your doctor, nurse or pharmacist before following any medical regimen to see if it is safe and effective for you

## 2021-04-22 NOTE — PHYSICAL THERAPY NOTE
Physical Therapy Evaluation     Patient's Name: Hardeep White    Admitting Diagnosis  Dizziness [R42]  Tinnitus of left ear [H93 12]    Problem List  Patient Active Problem List   Diagnosis    Insomnia    Dizziness       Past Medical History  History reviewed  No pertinent past medical history  Past Surgical History  Past Surgical History:   Procedure Laterality Date     SECTION         21 0853   PT Last Visit   PT Visit Date 21   Note Type   Note type Evaluation   Pain Assessment   Pain Assessment Tool Pain Assessment not indicated - pt denies pain   Pain Score No Pain   Home Living   Type of Home Apartment   Home Layout One level;Stairs to enter with rails; Able to live on main level with bedroom/bathroom   Bathroom Shower/Tub Tub/shower unit   Bathroom Toilet Standard   Bathroom Equipment   (no DME stated)   [address]   (no DME stated)   Additional Comments Pt lives alone in apartment with 4 RUBY   Prior Function   Level of [place] Independent with ADLs and functional mobility   Lives With Alone   ADL Assistance Independent   IADLs Independent   Falls in the last 6 months 0   Restrictions/Precautions   Weight Bearing Precautions Per Order No   Other Precautions   (Pt states she has dizziness when she touches head on L)   General   Additional Pertinent History Pt speaks English well and uses phone to translate Mandarin occasionally   Family/Caregiver Present No   Cognition   Overall Cognitive Status WFL   Arousal/Participation Alert   Orientation Level Oriented X4   Memory Within functional limits   Following Commands Follows all commands and directions without difficulty   Comments Pt was pleasant and cooperative throughout visit  Pt able to speak English well and uses phone to translate as needed    Pt states yesterday morning she has [place] episodes of dizziness that made her feel like she had to vomit   RLE Assessment   RLE Assessment [facility] LLE Assessment   LLE Assessment WFL   Coordination   Movements are Fluid and Coordinated 1   Sensation WFL   Bed Mobility   Supine to Sit 6  Modified independent   Sit to Supine 6  Modified independent   Additional Comments Pt supine in bed upon arrival   Pt performed functional mobility, ambulation and stair negotiation prior to returning to room seated EOB with all needs within reach   Transfers   Sit to Stand 6  Modified independent   Stand to Sit 6  Modified independent   Additional Comments Pt transfers without AD   Ambulation/Elevation   Gait pattern WNL   Gait Assistance 5  Supervision   Assistive Device None   Distance 40ftx2   Stair Management Assistance 5  Supervision   Additional items Verbal cues   Stair Management Technique No rails; Foreward;Reciprocal   Number of Stairs 7   Balance   Static Sitting Normal   Dynamic Sitting Fair +   Static Standing Fair +   Dynamic Standing Fair   Ambulatory Fair -   Endurance Deficit   Endurance Deficit No  (No endurance deficit witnessed on this date)   Activity Tolerance   Activity Tolerance Patient tolerated treatment well   Medical Staff Made Aware PT Amrik Duran OT April Prior Ella   Nurse Made Aware yes   Assessment   Prognosis Excellent   Problem List   (Dizziness)   Assessment Pt is 53 y o  female seen for PT evaluation s/p admit to [facility] on 4/21/2021 w/ Dizziness  PT consulted to assess pt's functional mobility and d/c needs  Order placed for PT eval and tx, w/ up w/ A order  Comorbidities affecting pt's physical performance at time of assessment include: Dizziness and insomnia  PTA, pt was independent w/ all functional mobility w/ no AD and lives alone in 1 level apartment  Personal factors affecting pt at time of IE include: primary language is Mandarin but able to speak Enlgish well and use phone to translate as needed  Pt lives alone in an apartment with 4 RUBY   Please find objective findings from PT assessment regarding body systems outlined above with impairments and limitations including fear of dizziness  The following objective measures performed on IE also reveal limitations: Modified [place]: 1 (no significant disability) and AM-PAC 6-Clicks: 98/20  Pt performed supine to sit and STS at mod I level  Pt ambulated 40ftx2 with S level and no AD  Pt negotiated 7 steps with no HR and reciprocal pattern  Pt states she is not dizzy throughout PT session  Pt's clinical presentation is currently stable  seen in pt's presentation of safe functional mobility and activity  Pt presents as functioning at baseline for all mobility  PT services are not recommended at this time  Pt has no further questions or concerns regarding PT services  Please re-consult PT if medical status changes  From PT/mobility standpoint, recommendation at time of d/c would be home w/ no rehabilitation needs pending progress in order to facilitate return to PLOF  Barriers to Discharge Decreased caregiver support  (lives alone)   Goals   Patient Goals to not be dizzy    PT Treatment Day 0   Recommendation   PT Discharge Recommendation No rehabilitation needs   PT - OK to Discharge Yes   Additional Comments when medically cleared for return home   [place] 8 in Bed Without Bedrails 4   Lying on Back to Sitting on Edge of Flat Bed 4   Moving Bed to Chair 4   Standing Up From Chair 4   Walk in Room 4   Climb 3-5 Stairs 4   Basic Mobility Inpatient Raw Score 24   Basic Mobility Standardized Score 57 68   Modified [place] Scale   Modified [place] Scale 1   The patient's AM-[facility] Basic Mobility Inpatient Short Form Raw Score is 24, Standardized Score is 57 68  A standardized score greater than 42 9 suggests the patient may benefit from discharge to home  Please also refer to the recommendation of the Physical Therapist for safe discharge planning        Bryan Brenner, SPT

## 2021-04-22 NOTE — RESTORATIVE TECHNICIAN NOTE
Restorative Specialist Mobility Note       Activity: Ambulate in colin, Ambulate in room, Bathroom privileges, Chair, Dangle, Stand at bedside(Educated/encouraged pt to ambulate with assistance 3-4 x's/day  Bed alarm on   Pt callbell, phone/tray within reach )     Assistive Device: None       Anny Ansari BS, Restorative Technician, [employer]

## 2021-04-22 NOTE — PLAN OF CARE
Problem: Potential for Falls  Goal: Patient will remain free of falls  Description: INTERVENTIONS:  - Assess patient frequently for physical needs  -  Identify cognitive and physical deficits and behaviors that affect risk of falls    -  [place] fall precautions as indicated by assessment   - Educate patient/family on patient safety including physical limitations  - Instruct patient to call for assistance with activity based on assessment  - Modify environment to reduce risk of injury  - Consider OT/PT consult to assist with strengthening/mobility  Outcome: Progressing     Problem: PAIN - ADULT  Goal: Verbalizes/displays adequate comfort level or baseline comfort level  Description: Interventions:  - Encourage patient to monitor pain and request assistance  - Assess pain using appropriate pain scale  - Administer analgesics based on type and severity of pain and evaluate response  - Implement non-pharmacological measures as appropriate and evaluate response  - Consider cultural and social influences on pain and pain management  - Notify physician/advanced practitioner if interventions unsuccessful or patient reports new pain  Outcome: Progressing     Problem: INFECTION - ADULT  Goal: Absence or prevention of progression during hospitalization  Description: INTERVENTIONS:  - Assess and monitor for signs and symptoms of infection  - Monitor lab/diagnostic results  - Monitor all insertion sites, i e  indwelling lines, tubes, and drains  - [place] appropriate cooling/warming therapies per order  - Administer medications as ordered  - Instruct and encourage patient and family to use good hand hygiene technique  - Identify and instruct in appropriate isolation precautions for identified infection/condition  Outcome: Progressing  Goal: Absence of fever/infection during neutropenic period  Description: INTERVENTIONS:  - Monitor WBC    Outcome: Progressing     Problem: SAFETY ADULT  Goal: Patient will remain free of falls  Description: INTERVENTIONS:  - Assess patient frequently for physical needs  -  Identify cognitive and physical deficits and behaviors that affect risk of falls    -  [place] fall precautions as indicated by assessment   - Educate patient/family on patient safety including physical limitations  - Instruct patient to call for assistance with activity based on assessment  - Modify environment to reduce risk of injury  - Consider OT/PT consult to assist with strengthening/mobility  Outcome: Progressing  Goal: Maintain or return to baseline ADL function  Description: INTERVENTIONS:  -  Assess patient's ability to carry out ADLs; assess patient's baseline for ADL function and identify physical deficits which impact ability to perform ADLs (bathing, care of mouth/teeth, toileting, grooming, dressing, etc )  - Assess/evaluate cause of self-care deficits   - Assess range of motion  - Assess patient's mobility; develop plan if impaired  - Assess patient's need for assistive devices and provide as appropriate  - Encourage maximum independence but intervene and supervise when necessary  - Involve family in performance of ADLs  - Assess for home care needs following discharge   - Consider OT consult to assist with ADL evaluation and planning for discharge  - Provide patient education as appropriate  Outcome: Progressing  Goal: Maintain or return mobility status to optimal level  Description: INTERVENTIONS:  - Assess patient's baseline mobility status (ambulation, transfers, stairs, etc )    - Identify cognitive and physical deficits and behaviors that affect mobility  - Identify mobility aids required to assist with transfers and/or ambulation (gait belt, sit-to-stand, lift, walker, cane, etc )  - [place] fall precautions as indicated by assessment  - Record patient progress and toleration of activity level on Mobility SBAR; progress patient to next Phase/Stage  - Instruct patient to call for assistance with activity based on assessment  - Consider rehabilitation consult to assist with strengthening/weightbearing, etc   Outcome: Progressing     Problem: DISCHARGE PLANNING  Goal: Discharge to home or other facility with appropriate resources  Description: INTERVENTIONS:  - Identify barriers to discharge w/patient and caregiver  - Arrange for needed discharge resources and transportation as appropriate  - Identify discharge learning needs (meds, wound care, etc )  - Arrange for interpretive services to assist at discharge as needed  - Refer to Case Management Department for coordinating discharge planning if the patient needs post-hospital services based on physician/advanced practitioner order or complex needs related to functional status, cognitive ability, or social support system  Outcome: Progressing     Problem: Knowledge Deficit  Goal: Patient/family/caregiver demonstrates understanding of disease process, treatment plan, medications, and discharge instructions  Description: Complete learning assessment and assess knowledge base    Interventions:  - Provide teaching at level of understanding  - Provide teaching via preferred learning methods  Outcome: Progressing

## 2021-04-22 NOTE — RESTORATIVE TECHNICIAN NOTE
Restorative Specialist Mobility Note       Activity: Ambulate in colin, Ambulate in room, Bathroom privileges, Chair, Dangle, Stand at bedside(Educated/encouraged pt to ambulate with assistance 3-4 x's/day  Bed alarm on   Pt callbell, phone/tray within reach )     Assistive Device: None       Dora DASH, Restorative Technician, [employer] [facility]

## 2021-04-22 NOTE — PLAN OF CARE
Problem: Potential for Falls  Goal: Patient will remain free of falls  Description: INTERVENTIONS:  - Assess patient frequently for physical needs  -  Identify cognitive and physical deficits and behaviors that affect risk of falls    -  [place] fall precautions as indicated by assessment   - Educate patient/family on patient safety including physical limitations  - Instruct patient to call for assistance with activity based on assessment  - Modify environment to reduce risk of injury  - Consider OT/PT consult to assist with strengthening/mobility  4/22/2021 0836 by [place] Matos RN  Outcome: Progressing  4/22/2021 0832 by [place]on Matos RN  Outcome: Progressing     Problem: PAIN - ADULT  Goal: Verbalizes/displays adequate comfort level or baseline comfort level  Description: Interventions:  - Encourage patient to monitor pain and request assistance  - Assess pain using appropriate pain scale  - Administer analgesics based on type and severity of pain and evaluate response  - Implement non-pharmacological measures as appropriate and evaluate response  - Consider cultural and social influences on pain and pain management  - Notify physician/advanced practitioner if interventions unsuccessful or patient reports new pain  4/22/2021 0836 by [place]on Matos RN  Outcome: Progressing  4/22/2021 0832 by Weston Matos RN  Outcome: Progressing     Problem: INFECTION - ADULT  Goal: Absence or prevention of progression during hospitalization  Description: INTERVENTIONS:  - Assess and monitor for signs and symptoms of infection  - Monitor lab/diagnostic results  - Monitor all insertion sites, i e  indwelling lines, tubes, and drains  - [place] appropriate cooling/warming therapies per order  - Administer medications as ordered  - Instruct and encourage patient and family to use good hand hygiene technique  - Identify and instruct in appropriate isolation precautions for identified infection/condition  4/22/2021 0836 by Nickolas Rodriguez RN  Outcome: Progressing  4/22/2021 0832 by Nickolas Rodriguez RN  Outcome: Progressing  Goal: Absence of fever/infection during neutropenic period  Description: INTERVENTIONS:  - Monitor WBC    4/22/2021 0836 by Nickolas Rodriguez RN  Outcome: Progressing  4/22/2021 0832 by Nickolas Rodriguez RN  Outcome: Progressing     Problem: SAFETY ADULT  Goal: Patient will remain free of falls  Description: INTERVENTIONS:  - Assess patient frequently for physical needs  -  Identify cognitive and physical deficits and behaviors that affect risk of falls    -  [place] fall precautions as indicated by assessment   - Educate patient/family on patient safety including physical limitations  - Instruct patient to call for assistance with activity based on assessment  - Modify environment to reduce risk of injury  - Consider OT/PT consult to assist with strengthening/mobility  4/22/2021 0836 by Nickolas Rodriguez RN  Outcome: Progressing  4/22/2021 0832 by Nickolas Rodriguez RN  Outcome: Progressing  Goal: Maintain or return to baseline ADL function  Description: INTERVENTIONS:  -  Assess patient's ability to carry out ADLs; assess patient's baseline for ADL function and identify physical deficits which impact ability to perform ADLs (bathing, care of mouth/teeth, toileting, grooming, dressing, etc )  - Assess/evaluate cause of self-care deficits   - Assess range of motion  - Assess patient's mobility; develop plan if impaired  - Assess patient's need for assistive devices and provide as appropriate  - Encourage maximum independence but intervene and supervise when necessary  - Involve family in performance of ADLs  - Assess for home care needs following discharge   - Consider OT consult to assist with ADL evaluation and planning for discharge  - Provide patient education as appropriate  4/22/2021 0836 by Nickolas Rodriguez RN  Outcome: Progressing  4/22/2021 0832 by Misha Bar RN  Outcome: Progressing  Goal: Maintain or return mobility status to optimal level  Description: INTERVENTIONS:  - Assess patient's baseline mobility status (ambulation, transfers, stairs, etc )    - Identify cognitive and physical deficits and behaviors that affect mobility  - Identify mobility aids required to assist with transfers and/or ambulation (gait belt, sit-to-stand, lift, walker, cane, etc )  - [place] fall precautions as indicated by assessment  - Record patient progress and toleration of activity level on Mobility SBAR; progress patient to next Phase/Stage  - Instruct patient to call for assistance with activity based on assessment  - Consider rehabilitation consult to assist with strengthening/weightbearing, etc   4/22/2021 0836 by Misha Bar RN  Outcome: Progressing  4/22/2021 0832 by Misha Bar RN  Outcome: Progressing     Problem: DISCHARGE PLANNING  Goal: Discharge to home or other facility with appropriate resources  Description: INTERVENTIONS:  - Identify barriers to discharge w/patient and caregiver  - Arrange for needed discharge resources and transportation as appropriate  - Identify discharge learning needs (meds, wound care, etc )  - Arrange for interpretive services to assist at discharge as needed  - Refer to Case Management Department for coordinating discharge planning if the patient needs post-hospital services based on physician/advanced practitioner order or complex needs related to functional status, cognitive ability, or social support system  4/22/2021 0836 by Misha Bar RN  Outcome: Progressing  4/22/2021 0832 by Misha Bar RN  Outcome: Progressing     Problem: Knowledge Deficit  Goal: Patient/family/caregiver demonstrates understanding of disease process, treatment plan, medications, and discharge instructions  Description: Complete learning assessment and assess knowledge base    Interventions:  - Provide teaching at level of understanding  - Provide teaching via preferred learning methods  4/22/2021 0836 by Jose aRmon RN  Outcome: Progressing  4/22/2021 0832 by Jose Ramon RN  Outcome: Progressing

## 2021-04-22 NOTE — OCCUPATIONAL THERAPY NOTE
Occupational Therapy Evaluation     Patient Name: Anahy BARNARD Date: 2021  Problem List  Principal Problem:    Dizziness  Active Problems:    Insomnia    Past Medical History  History reviewed  No pertinent past medical history  Past Surgical History  Past Surgical History:   Procedure Laterality Date     SECTION             21 0852   Note Type   Note type Evaluation   Restrictions/Precautions   Weight Bearing Precautions Per Order No   Other Precautions   ( Dizziness has resolved aside from when L head touched )   Pain Assessment   Pain Assessment Tool 0-10   Pain Score No Pain   Home Living   Type of Home Apartment   Home Layout One level; Able to live on main level with bedroom/bathroom;Stairs to enter with rails   Bathroom Shower/Tub Tub/shower unit   Bathroom Toilet Standard   Bathroom Equipment   (No DME reported )   [address] Other (Comment)  (No DME reported )   Additional Comments Pt lives alone in an apartment located on 1st floor, with 4 RUBY  Prior Function   Level of [place] Independent with ADLs and functional mobility   Lives With Alone   ADL Assistance Independent   IADLs Independent   Falls in the last 6 months 0   Vocational Full time employment   Comments Pt completed all ADLs/IADLs/functional mobility  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     Lifestyle   Autonomy Pt was independent in ADLs, IADLs, and functional mobility  Pt was driving      Reciprocal Relationships Will continue to assess    Service to Others Massage Therapist    Intrinsic Gratification Will continue to assess    Psychosocial   Psychosocial (WDL) WDL   ADL   Where Assessed Edge of bed   Eating Assistance 7  Independent   Grooming Assistance 7  Independent   UB Bathing Assistance 7  Independent   LB Bathing Assistance 6  Modified Independent   LB Bathing Deficit Increased time to complete   UB Dressing Assistance 7  Independent LB Dressing Assistance 6  Modified independent   LB Dressing Deficit Increased time to complete   Toileting Assistance  7  Independent   Functional Assistance 6  Modified independent   Bed Mobility   Supine to Sit 6  Modified independent   Additional items HOB elevated   Sit to Supine 6  Modified independent   Additional items HOB elevated   Additional Comments Pt self initated transfer from supine to EOB upon entrance  After completion of functional mobility to hallway and up/down stairs pt was left EOB with all needs within reach  Transfers   Sit to Stand 6  Modified independent   Stand to Sit 6  Modified independent   Additional Comments No use of AD required; pt did not report dizziness during functional transfers/mobility  Functional Mobility   Functional Mobility 6  Modified independent   Balance   Static Sitting Good   Dynamic Sitting Good   Static Standing Good   Dynamic Standing Good   Ambulatory Good   Activity Tolerance   Activity Tolerance Patient tolerated treatment well   Medical Staff Made Aware PT Seema iY; PTS Tamia   Nurse Made Aware Yes   RUE Assessment   RUE Assessment WFL   LUE Assessment   LUE Assessment WFL   Hand Function   Gross Motor Coordination Functional   Fine Motor Coordination Functional   Sensation   Additional Comments Per pt report/response to touch, LUE decreased sensation has resolved    Proprioception   Proprioception No apparent deficits   Vision-Basic Assessment   Current Vision No visual deficits   Vision - Complex Assessment   Ocular Range of Motion WFL   Perception   Inattention/Neglect Appears intact   Cognition   Overall Cognitive Status WFL   Arousal/Participation Alert; Responsive; Cooperative   Attention Within functional limits   Orientation Level Oriented X4   Memory Within functional limits   Following Commands Follows all commands and directions without difficulty   Comments Pt's primary language is Mandarin; pt initiated use of phone for  during misunderstood communication from therapists  Pt able to elaborate on recent events, including that she has felt dizziness up until yesterday that has since resolved  Assessment   Prognosis Good   Assessment Pt is a 53 y o female with Mandarin as primary language presenting with dizziness, tinnitus, vomiting, and decrease LUE sensation; symptoms have since resolved with medication intake (Reglan and meclizine)  Comorbidities include hyperacusis and likely labyrinthitis secondary to URI  Pt lives alone in an apartment located on the first floor with 4 RUBY and no DME available  PTA, pt was I with ADLs, IADLs, functional mobility, driving, and working as a massage therapist  Currently, pt is completing ADLs and functional mobility with Mod I due to potential onset of dizziness  From an OT standpoint, pt does not require a need for continued OT services; upon D/C,  pt should refrain from driving until dizziness has completley subsided      Goals   Patient Goals Return home    Recommendation   OT Discharge Recommendation No rehabilitation needs   OT - OK to Discharge Yes   AM-PAC Daily Activity Inpatient   Lower Body Dressing 4   Bathing 4   Toileting 4   Upper Body Dressing 4   Grooming 4   Eating 4   Daily Activity Raw Score 24   Daily Activity Standardized Score (Calc for Raw Score >=11) 57 54   AM-PAC Applied Cognition Inpatient   Following a Speech/Presentation 4   Understanding Ordinary Conversation 4   Taking Medications 4   Remembering Where Things Are Placed or Put Away 4   Remembering List of 4-5 Errands 4   Taking Care of Complicated Tasks 4   Applied Cognition Raw Score 24   Applied Cognition Standardized Score 62 21   Barthel Index   Feeding 10   Bathing 5   Grooming Score 5   Dressing Score 10   Bladder Score 10   Bowels Score 10   Toilet Use Score 10   Transfers (Bed/Chair) Score 15   Mobility (Level Surface) Score 15   Stairs Score 10   Barthel Index Score 100